# Patient Record
(demographics unavailable — no encounter records)

---

## 2024-11-14 NOTE — REASON FOR VISIT
[Initial] : an initial visit [Cough] : cough [TextEntry] : Since October patient has had a persistent cough. Patient has gone to Urgent Care twice. Patient was tested for the flu 3 days ago and tested positive.

## 2024-11-14 NOTE — HISTORY OF PRESENT ILLNESS
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

## 2024-11-14 NOTE — DISCUSSION/SUMMARY
[FreeTextEntry1] : Ms. Barry has a chronic cough for 1 month.  Is likely viral in origin.  Recently she tested positive for influenza A and was placed on Tamiflu.  Overall she is feeling better.  I have asked her to use the Tessalon Perles 1 pill twice a day.  If the cough does not resolve in 2 to 4 weeks she will return. The patient understands and agrees with plan of care. Today's office visit encompassed 46 minutes. I conducted an extensive history,physical exam and reviewed diagnosis and treatment options including diagnostic tests,radiology studies including cat scans and the use of prescription medication.